# Patient Record
Sex: FEMALE | Race: WHITE | NOT HISPANIC OR LATINO | Employment: FULL TIME | URBAN - METROPOLITAN AREA
[De-identification: names, ages, dates, MRNs, and addresses within clinical notes are randomized per-mention and may not be internally consistent; named-entity substitution may affect disease eponyms.]

---

## 2017-11-15 ENCOUNTER — APPOINTMENT (OUTPATIENT)
Dept: RADIOLOGY | Facility: MEDICAL CENTER | Age: 22
End: 2017-11-15
Attending: EMERGENCY MEDICINE
Payer: MEDICAID

## 2017-11-15 ENCOUNTER — HOSPITAL ENCOUNTER (EMERGENCY)
Facility: MEDICAL CENTER | Age: 22
End: 2017-11-15
Attending: EMERGENCY MEDICINE
Payer: MEDICAID

## 2017-11-15 VITALS
WEIGHT: 178.79 LBS | BODY MASS INDEX: 26.48 KG/M2 | TEMPERATURE: 99.1 F | HEIGHT: 69 IN | RESPIRATION RATE: 16 BRPM | SYSTOLIC BLOOD PRESSURE: 134 MMHG | OXYGEN SATURATION: 100 % | DIASTOLIC BLOOD PRESSURE: 81 MMHG | HEART RATE: 109 BPM

## 2017-11-15 DIAGNOSIS — M94.0 COSTOCHONDRITIS: ICD-10-CM

## 2017-11-15 DIAGNOSIS — R07.89 CHEST WALL PAIN: ICD-10-CM

## 2017-11-15 LAB
EKG IMPRESSION: NORMAL
HCG UR QL: NEGATIVE

## 2017-11-15 PROCEDURE — 81025 URINE PREGNANCY TEST: CPT

## 2017-11-15 PROCEDURE — A9270 NON-COVERED ITEM OR SERVICE: HCPCS | Performed by: EMERGENCY MEDICINE

## 2017-11-15 PROCEDURE — 71010 DX-CHEST-LIMITED (1 VIEW): CPT

## 2017-11-15 PROCEDURE — 99283 EMERGENCY DEPT VISIT LOW MDM: CPT

## 2017-11-15 PROCEDURE — 93005 ELECTROCARDIOGRAM TRACING: CPT

## 2017-11-15 PROCEDURE — 93005 ELECTROCARDIOGRAM TRACING: CPT | Performed by: EMERGENCY MEDICINE

## 2017-11-15 PROCEDURE — 700102 HCHG RX REV CODE 250 W/ 637 OVERRIDE(OP): Performed by: EMERGENCY MEDICINE

## 2017-11-15 RX ORDER — IBUPROFEN 600 MG/1
600 TABLET ORAL ONCE
Status: COMPLETED | OUTPATIENT
Start: 2017-11-15 | End: 2017-11-15

## 2017-11-15 RX ADMIN — IBUPROFEN 600 MG: 600 TABLET, FILM COATED ORAL at 16:30

## 2017-11-15 NOTE — ED PROVIDER NOTES
"ED Provider Note    CHIEF COMPLAINT  Chief Complaint   Patient presents with   • Chest Wall Pain       HPI  Mary Casanova is a 22 y.o. female who presentsFor retrosternal chest pain onset at 11 AM today. Pain is not pleuritic. She has shortness of breath due to the pain. She is rhinorrhea and nasal congestion but no fever cough or shortness of breath. No trauma. No heartburn.    REVIEW OF SYSTEMS  Pertinent positives include: Chest pain. One week late on her menstrual period, nausea  Pertinent negatives include: Dyspnea, leg swelling, calf pain.    PAST MEDICAL HISTORY  Denies    SOCIAL HISTORY  No tobacco use.    CURRENT MEDICATIONS  None    ALLERGIES  Allergies   Allergen Reactions   • Coconut Oil        PHYSICAL EXAM  VITAL SIGNS: /81   Pulse (!) 109   Temp 37.3 °C (99.1 °F)   Resp 16   Ht 1.753 m (5' 9\")   Wt 81.1 kg (178 lb 12.7 oz)   SpO2 100%   BMI 26.40 kg/m²    Constitutional :  Well developed, Well nourished,Borderline blood pressure elevation and tachycardia cardiac. No hypoxia room air.   HNT: Oropharynx moist without erythema.   Eyes: pupils reactive without eye discharge nor conjunctival hyperemia.  Cardiovascular: Tachycardic Regular rhythm, No murmurs, No rubs, No gallops.  No cyanosis. No dependent edema or calf cords.  Respiratory: Marked sternal chest wall tenderness which reproduces symptoms. No rales, rhonchi, wheeze.  Abdomen:  Soft, nontender  Skin: Warm, dry, no erythema, no rash.   Musculoskeletal: no limb deformities.    RADIOLOGY:  DX-CHEST-LIMITED (1 VIEW)   Final Result      No acute cardiopulmonary disease.      EKG Interpretation    Interpreted by me    Rhythm: normal sinus   Rate: normal at 85  Axis: normal  Ectopy: none  Conduction: normal  ST Segments: no acute change  T Waves: no acute change  Q Waves: none    Clinical Impression: Normal sinus rhythm        COURSE & MEDICAL DECISION MAKING  Well appearing patient presents with chest wall pain onset today that is " likely costochondritis. There is no evidence of pneumonia or pneumothorax or myocardial ischemia. Pulmonary embolism is unlikely.    PLAN:  Ibuprofen and Tylenol  Costochondritis handout  Return for shortness of breath, fever and cough, leg swelling    CONDITION:  Good.    FINAL IMPRESSION:  1. Chest wall pain    2. Costochondritis          Electronically signed by: Angel Yoon, 11/15/2017

## 2017-11-15 NOTE — ED NOTES
Pt ambulatory to triage c/o mid sternal chest wall pain and swelling that began suddenly approx 2 hours ago. Pt states pain is worse with inspiration and palpation. Pt denies recent illness or trauma. nad

## 2017-11-16 NOTE — ED NOTES
Patient dc'd to home w/ family. Patient alert and oriented x 4. Patient ambulatory w/ steady gait. Patient verbalizes understanding of discharge instructions and follow up care. Patient denies questions upon discharge.

## 2017-11-16 NOTE — DISCHARGE INSTRUCTIONS
Costochondritis  Take ibuprofen 600mg four times a day for up to 5 days.  Add tylenol as needed for persistent pain.  Return for leg swelling, shortness of breath, fever and cough or ill appearance.    Costochondritis, sometimes called Tietze syndrome, is a swelling and irritation (inflammation) of the tissue (cartilage) that connects your ribs with your breastbone (sternum). It causes pain in the chest and rib area. Costochondritis usually goes away on its own over time. It can take up to 6 weeks or longer to get better, especially if you are unable to limit your activities.  CAUSES   Some cases of costochondritis have no known cause. Possible causes include:  · Injury (trauma).  · Exercise or activity such as lifting.  · Severe coughing.  SIGNS AND SYMPTOMS  · Pain and tenderness in the chest and rib area.  · Pain that gets worse when coughing or taking deep breaths.  · Pain that gets worse with specific movements.  DIAGNOSIS   Your health care provider will do a physical exam and ask about your symptoms. Chest X-rays or other tests may be done to rule out other problems.  TREATMENT   Costochondritis usually goes away on its own over time. Your health care provider may prescribe medicine to help relieve pain.  HOME CARE INSTRUCTIONS   · Avoid exhausting physical activity. Try not to strain your ribs during normal activity. This would include any activities using chest, abdominal, and side muscles, especially if heavy weights are used.  · Apply ice to the affected area for the first 2 days after the pain begins.  ¨ Put ice in a plastic bag.  ¨ Place a towel between your skin and the bag.  ¨ Leave the ice on for 20 minutes, 2-3 times a day.  · Only take over-the-counter or prescription medicines as directed by your health care provider.  SEEK MEDICAL CARE IF:  · You have redness or swelling at the rib joints. These are signs of infection.  · Your pain does not go away despite rest or medicine.  SEEK IMMEDIATE  MEDICAL CARE IF:   · Your pain increases or you are very uncomfortable.  · You have shortness of breath or difficulty breathing.  · You cough up blood.  · You have worse chest pains, sweating, or vomiting.  · You have a fever or persistent symptoms for more than 2-3 days.  · You have a fever and your symptoms suddenly get worse.  MAKE SURE YOU:   · Understand these instructions.  · Will watch your condition.  · Will get help right away if you are not doing well or get worse.     This information is not intended to replace advice given to you by your health care provider. Make sure you discuss any questions you have with your health care provider.     Document Released: 09/27/2006 Document Revised: 10/08/2014 Document Reviewed: 07/22/2014  [employer] Interactive Patient Education ©2016 [employer]

## 2017-11-22 ENCOUNTER — NON-PROVIDER VISIT (OUTPATIENT)
Dept: OBGYN | Facility: CLINIC | Age: 22
End: 2017-11-22

## 2017-11-22 DIAGNOSIS — Z32.01 POSITIVE PREGNANCY TEST: ICD-10-CM

## 2017-11-22 LAB
INT CON NEG: NEGATIVE
INT CON POS: POSITIVE
POC URINE PREGNANCY TEST: POSITIVE

## 2017-11-22 PROCEDURE — 81025 URINE PREGNANCY TEST: CPT | Performed by: PHYSICIAN ASSISTANT

## 2017-12-04 ENCOUNTER — HOSPITAL ENCOUNTER (EMERGENCY)
Facility: MEDICAL CENTER | Age: 22
End: 2017-12-04
Attending: EMERGENCY MEDICINE
Payer: MEDICAID

## 2017-12-04 ENCOUNTER — APPOINTMENT (OUTPATIENT)
Dept: RADIOLOGY | Facility: MEDICAL CENTER | Age: 22
End: 2017-12-04
Attending: EMERGENCY MEDICINE
Payer: MEDICAID

## 2017-12-04 VITALS
HEART RATE: 82 BPM | TEMPERATURE: 98.9 F | BODY MASS INDEX: 27.49 KG/M2 | DIASTOLIC BLOOD PRESSURE: 73 MMHG | WEIGHT: 185.63 LBS | OXYGEN SATURATION: 98 % | SYSTOLIC BLOOD PRESSURE: 120 MMHG | RESPIRATION RATE: 16 BRPM | HEIGHT: 69 IN

## 2017-12-04 DIAGNOSIS — O20.0 THREATENED MISCARRIAGE: ICD-10-CM

## 2017-12-04 PROCEDURE — 99284 EMERGENCY DEPT VISIT MOD MDM: CPT

## 2017-12-04 PROCEDURE — 76817 TRANSVAGINAL US OBSTETRIC: CPT

## 2017-12-04 ASSESSMENT — LIFESTYLE VARIABLES: DO YOU DRINK ALCOHOL: NO

## 2017-12-04 ASSESSMENT — PAIN SCALES - GENERAL: PAINLEVEL_OUTOF10: 6

## 2017-12-05 NOTE — ED NOTES
Ambulatory to room.  Pr reports cramping x3 weeks.  Attempted to schedule with pregnancy center, but no appointment available until January.  Denies discharge, foul odor, bleeding.  Chart placed for ERP eval.

## 2017-12-05 NOTE — DISCHARGE INSTRUCTIONS
Threatened Miscarriage  A threatened miscarriage occurs when you have vaginal bleeding during your first 20 weeks of pregnancy but the pregnancy has not ended. If you have vaginal bleeding during this time, your health care provider will do tests to make sure you are still pregnant. If the tests show you are still pregnant and the developing baby (fetus) inside your womb (uterus) is still growing, your condition is considered a threatened miscarriage.  A threatened miscarriage does not mean your pregnancy will end, but it does increase the risk of losing your pregnancy (complete miscarriage).  CAUSES   The cause of a threatened miscarriage is usually not known. If you go on to have a complete miscarriage, the most common cause is an abnormal number of chromosomes in the developing baby. Chromosomes are the structures inside cells that hold all your genetic material.  Some causes of vaginal bleeding that do not result in miscarriage include:  · Having sex.  · Having an infection.  · Normal hormone changes of pregnancy.  · Bleeding that occurs when an egg implants in your uterus.  RISK FACTORS  Risk factors for bleeding in early pregnancy include:  · Obesity.  · Smoking.  · Drinking excessive amounts of alcohol or caffeine.  · Recreational drug use.  SIGNS AND SYMPTOMS  · Light vaginal bleeding.  · Mild abdominal pain or cramps.  DIAGNOSIS   If you have bleeding with or without abdominal pain before 20 weeks of pregnancy, your health care provider will do tests to check whether you are still pregnant. One important test involves using sound waves and a computer (ultrasound) to create images of the inside of your uterus. Other tests include an internal exam of your vagina and uterus (pelvic exam) and measurement of your baby's heart rate.   You may be diagnosed with a threatened miscarriage if:  · Ultrasound testing shows you are still pregnant.  · Your baby's heart rate is strong.  · A pelvic exam shows that the  opening between your uterus and your vagina (cervix) is closed.  · Your heart rate and blood pressure are stable.  · Blood tests confirm you are still pregnant.  TREATMENT   No treatments have been shown to prevent a threatened miscarriage from going on to a complete miscarriage. However, the right home care is important.   HOME CARE INSTRUCTIONS   · Make sure you keep all your appointments for prenatal care. This is very important.  · Get plenty of rest.  · Do not have sex or use tampons if you have vaginal bleeding.  · Do not douche.  · Do not smoke or use recreational drugs.  · Do not drink alcohol.  · Avoid caffeine.  SEEK MEDICAL CARE IF:  · You have light vaginal bleeding or spotting while pregnant.  · You have abdominal pain or cramping.  · You have a fever.  SEEK IMMEDIATE MEDICAL CARE IF:  · You have heavy vaginal bleeding.  · You have blood clots coming from your vagina.  · You have severe low back pain or abdominal cramps.  · You have fever, chills, and severe abdominal pain.  MAKE SURE YOU:  · Understand these instructions.  · Will watch your condition.  · Will get help right away if you are not doing well or get worse.     This information is not intended to replace advice given to you by your health care provider. Make sure you discuss any questions you have with your health care provider.     Document Released: 12/18/2006 Document Revised: 12/23/2014 Document Reviewed: 10/14/2014  [employer] Interactive Patient Education ©2016 [employer]

## 2017-12-05 NOTE — ED PROVIDER NOTES
ED Provider Note    HPI:  Patient is a 22-year-old female who presented to the emergency department December 4, 2017at 5:27 PM with a chief complaint of crampy abdominal pain.    Patient is proximally weeks pregnant. She's had crampy abdominal pain over the last 3 weeks. She had no vaginal bleeding. She had no nausea vomiting. No fever no chills no cough. No dysuria. Pain is in the pelvic region both sides. It is not positional in nature. Nothing seems to make it better or worse.    Review of Systems: Positive for intermittent crampy abdominal pain negative for nausea vomiting fever chills cough dysuria. Review of systems reviewed with patient, all other systems negative    Past medical/surgical history: Patient is a weeks pregnant. Costochondritis tonsillectomy adenoidectomy      Medications: Prenatal vitamins    Allergies: Coconut oil    Social History: Positive for smoking positive for alcohol use      Physical exam: Constitutional: Pleasant female awake alert   Vital signs: Temperature 98.7 pulse 95 respirations 16 blood pressure 146/65 pulse oximetry 97%  EYES: PERRL, EOMI, Conjunctivae and sclera normal, eyelids normal bilaterally.  Neck: Trachea midline. No cervical masses seen or palpated. Normal range of motion, supple. No meningeal signs elicited.  Cardiac: Regular rate and rhythm. S1-S2 present. No S3 or S4 present. No murmurs, rubs, or gallops heard. No edema or varicosities were seen.   Lungs: Clear to auscultation with good aeration throughout. No wheezes, rales, or rhonchi heard. Patient's chest wall moved symmetrically with each respiratory effort. Patient was not making use of accessory muscles of respiration in breathing.  Abdomen: Soft nontender to palpation. Subjective crampiness in the pelvic region that is not increased with palpation. No rebound or guarding elicited. No organomegaly identified. No pulsatile abdominal masses identified.   Musculoskeletal:  no  pain with palpitation or movement  of muscle, bone or joint , no obvious musculoskeletal deformities identified.  Neurologic: alert and awake answers questions appropriately. Moves all four extremities independently, no gross focal abnormalities identified. Normal strength and motor.  Skin: no rash or lesion seen, no palpable dermatologic lesions identified.  Psychiatric: Patient appeared to be slightly anxious but not inappropriately so. She was not delusional or hallucinating    Medical decision making:  Given the presenting complaints about ruling out ectopic pregnancy was needed. Pelvic ultrasound obtained; 6 week IUP appears to be present. No other acute abnormalities were seen.    Patient be discharged with instructions for threatened miscarriage. She'll follow-up with the pregnancy Center. Patient's counselor return to ED for any vaginal bleeding increasing pain vomiting or other problems. As the patient has had no bleeding at this time rhogam would not be indicated.    Patient verbalized understanding of the above instructions and states she will comply    Impression threatened miscarriage

## 2017-12-05 NOTE — ED NOTES
Chief Complaint   Patient presents with   • Abdominal Cramping     x3wks, 8wks pregnant     Pt ambulated to triage, she said 8wks pregnant and would like to get checked due to abdominal cramping. Denies vaginal bleeding.

## 2018-01-04 ENCOUNTER — HOSPITAL ENCOUNTER (EMERGENCY)
Facility: MEDICAL CENTER | Age: 23
End: 2018-01-04
Attending: EMERGENCY MEDICINE
Payer: MEDICAID

## 2018-01-04 VITALS
BODY MASS INDEX: 26.35 KG/M2 | RESPIRATION RATE: 18 BRPM | SYSTOLIC BLOOD PRESSURE: 128 MMHG | DIASTOLIC BLOOD PRESSURE: 77 MMHG | HEART RATE: 96 BPM | TEMPERATURE: 97.9 F | OXYGEN SATURATION: 97 % | WEIGHT: 177.91 LBS | HEIGHT: 69 IN

## 2018-01-04 DIAGNOSIS — Z3A.10 10 WEEKS GESTATION OF PREGNANCY: ICD-10-CM

## 2018-01-04 DIAGNOSIS — R10.30 LOWER ABDOMINAL PAIN: ICD-10-CM

## 2018-01-04 LAB
ALBUMIN SERPL BCP-MCNC: 4.3 G/DL (ref 3.2–4.9)
ALBUMIN/GLOB SERPL: 1.2 G/DL
ALP SERPL-CCNC: 53 U/L (ref 30–99)
ALT SERPL-CCNC: 11 U/L (ref 2–50)
ANION GAP SERPL CALC-SCNC: 10 MMOL/L (ref 0–11.9)
APPEARANCE UR: ABNORMAL
AST SERPL-CCNC: 37 U/L (ref 12–45)
BACTERIA #/AREA URNS HPF: ABNORMAL /HPF
BACTERIA GENITAL QL WET PREP: NORMAL
BASOPHILS # BLD AUTO: 0 % (ref 0–1.8)
BASOPHILS # BLD: 0 K/UL (ref 0–0.12)
BILIRUB SERPL-MCNC: 0.4 MG/DL (ref 0.1–1.5)
BILIRUB UR QL STRIP.AUTO: NEGATIVE
BUN SERPL-MCNC: 4 MG/DL (ref 8–22)
CALCIUM SERPL-MCNC: 9.7 MG/DL (ref 8.5–10.5)
CHLORIDE SERPL-SCNC: 105 MMOL/L (ref 96–112)
CO2 SERPL-SCNC: 20 MMOL/L (ref 20–33)
COLOR UR: YELLOW
CREAT SERPL-MCNC: 0.53 MG/DL (ref 0.5–1.4)
EOSINOPHIL # BLD AUTO: 0.06 K/UL (ref 0–0.51)
EOSINOPHIL NFR BLD: 0.8 % (ref 0–6.9)
EPI CELLS #/AREA URNS HPF: ABNORMAL /HPF
ERYTHROCYTE [DISTWIDTH] IN BLOOD BY AUTOMATED COUNT: 36.3 FL (ref 35.9–50)
GFR SERPL CREATININE-BSD FRML MDRD: >60 ML/MIN/1.73 M 2
GLOBULIN SER CALC-MCNC: 3.6 G/DL (ref 1.9–3.5)
GLUCOSE SERPL-MCNC: 109 MG/DL (ref 65–99)
GLUCOSE UR STRIP.AUTO-MCNC: NEGATIVE MG/DL
HCT VFR BLD AUTO: 42.2 % (ref 37–47)
HGB BLD-MCNC: 14.8 G/DL (ref 12–16)
HYALINE CASTS #/AREA URNS LPF: ABNORMAL /LPF
KETONES UR STRIP.AUTO-MCNC: NEGATIVE MG/DL
LEUKOCYTE ESTERASE UR QL STRIP.AUTO: ABNORMAL
LIPASE SERPL-CCNC: 24 U/L (ref 11–82)
LYMPHOCYTES # BLD AUTO: 2.42 K/UL (ref 1–4.8)
LYMPHOCYTES NFR BLD: 30.2 % (ref 22–41)
MANUAL DIFF BLD: NORMAL
MCH RBC QN AUTO: 30.8 PG (ref 27–33)
MCHC RBC AUTO-ENTMCNC: 35.1 G/DL (ref 33.6–35)
MCV RBC AUTO: 87.9 FL (ref 81.4–97.8)
MICRO URNS: ABNORMAL
MONOCYTES # BLD AUTO: 0.21 K/UL (ref 0–0.85)
MONOCYTES NFR BLD AUTO: 2.6 % (ref 0–13.4)
MORPHOLOGY BLD-IMP: NORMAL
NEUTROPHILS # BLD AUTO: 5.31 K/UL (ref 2–7.15)
NEUTROPHILS NFR BLD: 66.4 % (ref 44–72)
NITRITE UR QL STRIP.AUTO: NEGATIVE
NRBC # BLD AUTO: 0 K/UL
NRBC BLD-RTO: 0 /100 WBC
PH UR STRIP.AUTO: 7.5 [PH]
PLATELET # BLD AUTO: 131 K/UL (ref 164–446)
PLATELET BLD QL SMEAR: NORMAL
PMV BLD AUTO: 11.1 FL (ref 9–12.9)
POTASSIUM SERPL-SCNC: 5.2 MMOL/L (ref 3.6–5.5)
PROT SERPL-MCNC: 7.9 G/DL (ref 6–8.2)
PROT UR QL STRIP: NEGATIVE MG/DL
RBC # BLD AUTO: 4.8 M/UL (ref 4.2–5.4)
RBC # URNS HPF: ABNORMAL /HPF
RBC BLD AUTO: NORMAL
RBC UR QL AUTO: NEGATIVE
SIGNIFICANT IND 70042: NORMAL
SITE SITE: NORMAL
SODIUM SERPL-SCNC: 135 MMOL/L (ref 135–145)
SOURCE SOURCE: NORMAL
SP GR UR STRIP.AUTO: 1.02
UROBILINOGEN UR STRIP.AUTO-MCNC: 1 MG/DL
WBC # BLD AUTO: 8 K/UL (ref 4.8–10.8)
WBC #/AREA URNS HPF: ABNORMAL /HPF

## 2018-01-04 PROCEDURE — 87591 N.GONORRHOEAE DNA AMP PROB: CPT | Mod: EDC

## 2018-01-04 PROCEDURE — 36415 COLL VENOUS BLD VENIPUNCTURE: CPT | Mod: EDC

## 2018-01-04 PROCEDURE — 99284 EMERGENCY DEPT VISIT MOD MDM: CPT | Mod: EDC

## 2018-01-04 PROCEDURE — 83690 ASSAY OF LIPASE: CPT | Mod: EDC

## 2018-01-04 PROCEDURE — 85007 BL SMEAR W/DIFF WBC COUNT: CPT | Mod: EDC

## 2018-01-04 PROCEDURE — 85027 COMPLETE CBC AUTOMATED: CPT | Mod: EDC

## 2018-01-04 PROCEDURE — 81001 URINALYSIS AUTO W/SCOPE: CPT | Mod: EDC

## 2018-01-04 PROCEDURE — 87491 CHLMYD TRACH DNA AMP PROBE: CPT | Mod: EDC

## 2018-01-04 PROCEDURE — 80053 COMPREHEN METABOLIC PANEL: CPT | Mod: EDC

## 2018-01-04 RX ORDER — NITROFURANTOIN 25; 75 MG/1; MG/1
100 CAPSULE ORAL 2 TIMES DAILY
Qty: 6 CAP | Refills: 0 | Status: SHIPPED | OUTPATIENT
Start: 2018-01-04 | End: 2018-01-07

## 2018-01-04 NOTE — ED NOTES
Ambulates to triage  Chief Complaint   Patient presents with   • Pregnancy     12 weeks 5 days   • Cramping     denies any vag bleeding     Was seen here about 1 month ago for same, and was told to come back if her cramping got worse.  Has an appt with her OB on 1/23 at the pregnancy center.

## 2018-01-05 RX ORDER — METRONIDAZOLE 500 MG/1
500 TABLET ORAL 2 TIMES DAILY
Qty: 14 TAB | Refills: 0 | Status: SHIPPED | OUTPATIENT
Start: 2018-01-05 | End: 2018-01-12

## 2018-01-05 NOTE — DISCHARGE INSTRUCTIONS
Your estimated date of delivery is 7/30/18 based on the ultrasound performed 12/4/17. At this time your lab work and exam are normal. Please keep your scheduled follow-up appointment at [facility] pregnancy center. Return to the emergency department if you develop worsening pain, vaginal bleeding, vomiting or inability to tolerate fluids, fevers, or any further concerns.    Abdominal Pain During Pregnancy  Abdominal pain is common in pregnancy. Most of the time, it does not cause harm. There are many causes of abdominal pain. Some causes are more serious than others. Some of the causes of abdominal pain in pregnancy are easily diagnosed. Occasionally, the diagnosis takes time to understand. Other times, the cause is not determined. Abdominal pain can be a sign that something is very wrong with the pregnancy, or the pain may have nothing to do with the pregnancy at all. For this reason, always tell your health care provider if you have any abdominal discomfort.  HOME CARE INSTRUCTIONS   Monitor your abdominal pain for any changes. The following actions may help to alleviate any discomfort you are experiencing:  · Do not have sexual intercourse or put anything in your vagina until your symptoms go away completely.  · Get plenty of rest until your pain improves.  · Drink clear fluids if you feel nauseous. Avoid solid food as long as you are uncomfortable or nauseous.  · Only take over-the-counter or prescription medicine as directed by your health care provider.  · Keep all follow-up appointments with your health care provider.  SEEK IMMEDIATE MEDICAL CARE IF:  · You are bleeding, leaking fluid, or passing tissue from the vagina.  · You have increasing pain or cramping.  · You have persistent vomiting.  · You have painful or bloody urination.  · You have a fever.  · You notice a decrease in your baby's movements.  · You have extreme weakness or feel faint.  · You have shortness of breath, with or without abdominal  pain.  · You develop a severe headache with abdominal pain.  · You have abnormal vaginal discharge with abdominal pain.  · You have persistent diarrhea.  · You have abdominal pain that continues even after rest, or gets worse.  MAKE SURE YOU:   · Understand these instructions.  · Will watch your condition.  · Will get help right away if you are not doing well or get worse.     This information is not intended to replace advice given to you by your health care provider. Make sure you discuss any questions you have with your health care provider.     Document Released: 12/18/2006 Document Revised: 10/08/2014 Document Reviewed: 07/17/2014  [email or web address] Interactive Patient Education ©2016 [employer]

## 2018-01-05 NOTE — ED PROVIDER NOTES
ED Provider Note     Scribed for Jeanette Campuzano M.D. by Beelm Gong. 2018, 4:28 PM.    Primary Care Provider: Pcp Pt States None  Means of arrival: walk in   History obtained from: patient  History limited by: none    CHIEF COMPLAINT  Chief Complaint   Patient presents with   • Pregnancy     12 weeks 5 days   • Cramping     denies any vag bleeding     HPI  Mary Casanova is a 22 y.o. Female  at 12 5/7 by last menstrual period start date who presents to the Emergency Department for episodic abdominal pain onset one month ago.  The patient describes the pain as a cramp and notes that it has progressively become more severe. She notes that the pain starts in her lower pelvic region and radiates up under her breast region. The patient notes that she is experiencing minor dysuria and minor vaginal bleeding.  She states that she is nauseated but denies any vomiting or diarrhea.  The patient denies any new chest pain, leg pain or swelling, abnormal bruising, or shortness of breath.  She was seen at the ED one month ago for abdominal cramping and had a normal transvaginal ultrasound.  The ED provider advised she return if her cramping worsens and she notes that the pain is the same quality but more severe.  The [facility] . The patient's last menstrual period started on 10/6/17.     REVIEW OF SYSTEMS  Pertinent positives include abdominal pain, dysuria, vaginal bleeding, nausea. Pertinent negatives include no vomiting, diarrhea, chest pain, leg pain, leg swelling, bruising, shortness of breath. See HPI for further details. As above, all other systems negative.  C    PAST MEDICAL HISTORY   has a past medical history of Costochondritis.    SOCIAL HISTORY  Social History   Substance Use Topics   • Smoking status: Current Some Day Smoker   • Smokeless tobacco: Never Used   • Alcohol use Yes      History   Drug Use No     SURGICAL HISTORY   has  "a past surgical history that includes tonsillectomy and adenoidectomy (12/30/2008).     CURRENT MEDICATIONS  No current facility-administered medications on file prior to encounter.      Current Outpatient Prescriptions on File Prior to Encounter   Medication Sig Dispense Refill   • Prenatal MV-Min-Fe Fum-FA-DHA (PRENATAL 1 PO) Take  by mouth.         ALLERGIES  Allergies   Allergen Reactions   • Coconut Oil      PHYSICAL EXAM  Vital Signs: /70   Pulse 98   Temp 37.2 °C (98.9 °F)   Resp 16   Ht 1.753 m (5' 9\")   Wt 80.7 kg (177 lb 14.6 oz)   SpO2 99%   BMI 26.27 kg/m²   Constitutional: Alert, no acute distress  HENT: Normocephalic, atraumatic, moist mucus membranes  Eyes: Pupils equal and reactive, normal conjunctiva, non-icteric  Neck: Supple, normal range of motion, no stridor  Cardiovascular: Regular rhythm, Normal peripheral perfusion, no cyanosis   Pulmonary: No respiratory distress, normal work of breathing, no accessory muscule usage  Abdomen: Soft, non tender, no peritoneal signs, bowel sounds are present. No right lower quadrant tenderness. On bedside transabdominal ultrasound,Intrauterine pregnancy is visualized with fetal heart rate 176  : Normal external genitalia, cervical os closed, no blood in vaginal vault, no CMT, no evidence of bleeding  Skin: Warm, dry, no rashes or lesions  Back: No pain with active range of motion  Musculoskeletal: Normal range of motion in all extremities, no swelling or deformity noted  Neurologic: Alert, oriented, normal motor function, no speech deficits  Psychiatric: Normal and appropriate mood and affect    LABS  Labs Reviewed   URINALYSIS - Abnormal; Notable for the following:        Result Value    Character Turbid (*)     Leukocyte Esterase Moderate (*)     All other components within normal limits   URINE MICROSCOPIC (W/UA) - Abnormal; Notable for the following:     WBC 20-50 (*)     Bacteria Few (*)     All other components within normal limits   CBC " "WITH DIFFERENTIAL - Abnormal; Notable for the following:     MCHC 35.1 (*)     Platelet Count 131 (*)     All other components within normal limits   COMP METABOLIC PANEL - Abnormal; Notable for the following:     Glucose 109 (*)     Bun 4 (*)     Globulin 3.6 (*)     All other components within normal limits   LIPASE   ESTIMATED GFR   DIFFERENTIAL MANUAL   PERIPHERAL SMEAR REVIEW   PLATELET ESTIMATE   MORPHOLOGY   WET PREP   CHLAMYDIA/GC PCR URINE OR SWAB   All labs reviewed by me.    COURSE & MEDICAL DECISION MAKING  Nursing notes, VS, PMSFHx reviewed in chart.     Medical records reviewed for continuity of care. Patient seen and evaluated 12/4/17 for abdominal pain and pelvic pain. Transvaginal ultrasound performed at that time demonstrates a 6 week 0 day intrauterine pregnancy with estimated date of delivery of 7/30/18. Fetal heart rate of 104 bpm.    4:28 PM - Patient seen and examined at bedside. Ordered urinalysis and urine microscopic, CBC with differential, CMP, lipase, chlamydia/GC PCR urine or swap, wet prep, differential manual, peripheral smear review, platelet estimate, morphology, estimated GFR to evaluate her symptoms. I performed a bedside ultrasound and discussed the fetal heart rate and plans to test her urine for possible infection.     6:54 PM  - Re-examined; The patient is resting in bed comfortably. I discussed her above findings and plans for discharge with a prescription for Macrobid 100mg capsule. She was given a referral to [facility] up with the [facility] and instructed to return to the ED if her symptoms worsen. Patient understands and agrees. Her vitals prior to discharge are: /77   Pulse 96   Temp 36.6 °C (97.9 °F)   Resp 18   Ht 1.753 m (5' 9\")   Wt 80.7 kg (177 lb 14.6 oz)   SpO2 97%   BMI 26.27 kg/m²     Differential diagnosis includes threatened miscarriage, normal pregnancy, cholecystitis, cholelithiasis, pancreatitis, appendicitis    Decision Making:  This is a 22 " y.o. year old who presents with 6 weeks of pelvic pain and abdominal cramping. She was previously seen in this emergency department, diagnosed with intrauterine pregnancy. Her primary concern is that she may be having a miscarriage. She is scheduled for follow-up [facility] pregnancy Center.    She is afebrile, her abdominal exam is benign with no right lower quadrant tenderness, no epigastric tenderness, no right upper quadrant tenderness. Physical exam is less concerning for appendicitis, cholecystitis, cholelithiasis nor pancreatitis. She has a normal vaginal exam, no evidence of vaginal bleeding, cervical os is closed. Intrauterine pregnancy is again visualized on my bedside ultrasound.     On laboratory evaluation wet prep demonstrates clue cells and white blood cells. Patient was discharged without a prescription for metronidazole. This prescription was left with charge nurse who will contact the patient with these results in the morning. Patient can  her prescription or we will call it into the pharmacy of her choice. White blood count is within normal limits, again less concerning for appendicitis or cholecystitis. CMP is within normal limits. Urinalysis significant for 20-50 white blood cells. Few bacteria. Culture sent. GC chlamydia pending at this time.    Due to her history of mild dysuria she was treated with Macrobid for urinary tract infection. She will be contacted to  a metronidazole prescription as described above. Return precautions were given including worsening pain, development of vaginal bleeding, development of vomiting, or any further concerns.    The patient will return for new or worsening symptoms and is stable at the time of discharge.    The patient is referred to a primary physician for blood pressure management, diabetic screening, and for all other preventative health concerns.    DISPOSITION:  Patient will be discharged home in stable condition.    FOLLOW UP:  [facility]  Pregnancy Center  [address] [place] [address]  [place] [address]  [phone]    Go in 2 weeks  Please keep your scheduled follow up appointment    [facility], Emergency Dept  [address]  [place] [address] [phone]  Go to  If symptoms worsen      OUTPATIENT MEDICATIONS:  Discharge Medication List as of 1/4/2018  7:05 PM      START taking these medications    Details   nitrofurantoin monohydr macro (MACROBID) 100 MG Cap Take 1 Cap by mouth 2 times a day for 3 days., Disp-6 Cap, R-0, Print Rx Paper           FINAL IMPRESSION  1. Lower abdominal pain    2. 10 weeks gestation of pregnancy          I, Belem Gong (Scribe), am scribing for, and in the presence of, Jeanette Campuzano M.D..    Electronically signed by: Belem Gong (Scribe), 1/4/2018    IJeanette M.D. personally performed the services described in this documentation, as scribed by Belem Gong in my presence, and it is both accurate and complete.    The note accurately reflects work and decisions made by me.  Jeanette Campuzano  1/5/2018  1:41 AM

## 2018-01-05 NOTE — ED NOTES
Pt provided discharge instructions.  In such instructions, the patient made aware of medical diagnosis, treatment team, follow up recommendations for continuity of care, how to access [facility] health information online, information on medical prescriptions (how to take, when to take/not to take, side effects and adverse effects), and other health information pertinent to patient's diagnosis.  Patient verbalized understanding of discharge information.

## 2018-01-05 NOTE — ED NOTES
Agree with triage RN.  Pt ambulated to room in NAD.  Pt states appx 12 weeks pregnant with lower bilateral abdominal cramping.  Pt denies n/v/d, pt denies vaginal bleeding

## 2018-01-05 NOTE — ED NOTES
Unsuccessful IV x 1.  Blood specimen obtained and sent to lab.  Pt refusing additional IV access/lab draw at this time

## 2018-01-06 LAB
C TRACH DNA SPEC QL NAA+PROBE: NEGATIVE
N GONORRHOEA DNA SPEC QL NAA+PROBE: NEGATIVE
SPECIMEN SOURCE: NORMAL

## 2018-01-06 NOTE — ED NOTES
Second call placed to notify pt that prescription was left in ED. No answer, second message left.

## 2018-01-23 ENCOUNTER — INITIAL PRENATAL (OUTPATIENT)
Dept: OBGYN | Facility: CLINIC | Age: 23
End: 2018-01-23
Payer: MEDICAID

## 2018-01-23 VITALS
HEIGHT: 69 IN | SYSTOLIC BLOOD PRESSURE: 106 MMHG | BODY MASS INDEX: 25.77 KG/M2 | DIASTOLIC BLOOD PRESSURE: 60 MMHG | WEIGHT: 174 LBS

## 2018-01-23 DIAGNOSIS — Z34.02 ENCOUNTER FOR SUPERVISION OF NORMAL FIRST PREGNANCY IN SECOND TRIMESTER: Primary | ICD-10-CM

## 2018-01-23 DIAGNOSIS — B37.9 YEAST INFECTION: ICD-10-CM

## 2018-01-23 LAB
APPEARANCE UR: NORMAL
BILIRUB UR STRIP-MCNC: NORMAL MG/DL
COLOR UR AUTO: NORMAL
GLUCOSE UR STRIP.AUTO-MCNC: NORMAL MG/DL
KETONES UR STRIP.AUTO-MCNC: NORMAL MG/DL
LEUKOCYTE ESTERASE UR QL STRIP.AUTO: NORMAL
NITRITE UR QL STRIP.AUTO: NORMAL
PH UR STRIP.AUTO: 6 [PH] (ref 5–8)
PROT UR QL STRIP: NORMAL MG/DL
RBC UR QL AUTO: NORMAL
SP GR UR STRIP.AUTO: 1.03
UROBILINOGEN UR STRIP-MCNC: NORMAL MG/DL

## 2018-01-23 PROCEDURE — 81002 URINALYSIS NONAUTO W/O SCOPE: CPT | Performed by: NURSE PRACTITIONER

## 2018-01-23 PROCEDURE — 59401 PR NEW OB VISIT: CPT | Performed by: NURSE PRACTITIONER

## 2018-01-23 RX ORDER — FLUCONAZOLE 150 MG/1
150 TABLET ORAL DAILY
Qty: 1 TAB | Refills: 0 | Status: SHIPPED | OUTPATIENT
Start: 2018-01-23

## 2018-01-23 ASSESSMENT — ENCOUNTER SYMPTOMS
EYES NEGATIVE: 1
CARDIOVASCULAR NEGATIVE: 1
NEUROLOGICAL NEGATIVE: 1
PSYCHIATRIC NEGATIVE: 1
MUSCULOSKELETAL NEGATIVE: 1
RESPIRATORY NEGATIVE: 1
GASTROINTESTINAL NEGATIVE: 1
CONSTITUTIONAL NEGATIVE: 1

## 2018-01-23 NOTE — LETTER
Cystic Fibrosis Carrier Testing  Mary Casanova    The following information is about a blood test that can be done to determine if you and/or your partner carry the gene for cystic fibrosis.    WHAT IS CYSTIC FIBROSIS?  · Cystic fibrosis (CF) is an inherited disease that affects more than 25,000 American children and young adults.  · Symptoms of CF vary but include lung congestion, pneumonia, diarrhea and poor growth.  Most people with CF have severe medical problems and some die at a young age.  Others have so few symptoms they are unaware they have CF.  · CF does not affect intelligence.  · Although there is no cure for CF at this time, scientists are making progress in improving treatment and in searching for a cure.  In the past many people with CF  at a very young age.  Today, many are living into their 20’s and 30’s.    IS THERE A CHANCE MY BABY COULD HAVE CYSTIC FIBROSIS?  · You can have a child with CF even if there is no history in your family (see chart below).  · CF testing can help determine if you are a carrier and at risk to have a child with CF.  Note: if both parents are carriers, there is a 1 in 4 (25%) chance with each pregnancy that they will have a child with CF.  · Carriers have one normal CF gene and one altered CF gene.  · People with CF have two altered CF genes.  · Most people have two normal copies of the CF gene.    Approximate risk that a couple with no family history of cystic fibrosis will have a child with cystic fibrosis:    Ethnic background / Risk     couple:  1 in 2,500   couple:  1 in 15,000            couple:  1 in 8,000     American couple:  1 in 32,000     WHAT TESTING IS AVAILABLE?  · There is a blood test that can be done to find out if you or your partner is a carrier.  · It is important to understand that CF carrier testing does not detect all CF carriers.  · If the test shows that you are both CF carriers, you unborn baby can  be tested to find out if the baby has CF.    HOW MUCH DOES IT COST TO HAVE CYSTIC FIBROSIS CARRIER TESTING?  · Cost and insurance coverage for CF carrier testing vary depending upon the laboratory used and your insurance policy.  · The average cost for CF carrier testing is $300 per person.  · Your genetic counselor can provide you with more information about cystic fibrosis carrier testing.    _____  Yes, I am interested in discussing carrier testing with a genetic counselor.    _____  No, I am not interested in CF carrier testing or in receiving more information about CF carrier testing.      Client signature: ________________________________________  1/23/2018

## 2018-01-23 NOTE — PROGRESS NOTES
Pt. Here for NOB visit today.  # [phone] sister's number, use temporarily due to her phone disconnected   Pt states went to [facility] ER on 12/4/17 for cramping and bleeding had U/S done   Pt. States having vaginal itching and burning due to medication she took for UTI  Pharmacy verified.   Flu vaccine declined.

## 2018-01-23 NOTE — PROGRESS NOTES
"Subjective:      S:  Mary Casanova is a 22 y.o.  female  @ EGA: 13w1d AKIRA: Estimated Date of Delivery: 18  per US who presents for her new OB exam.  She reports developing yeast infection from abx use, also cramping.  Desires AFP.  Declines CF.  Reports no FM, VB, LOF.  Denies dysuria, vaginal DC.  Pt is single and lives alone, FOB not involved. Has no support in area. Works at [employer].  Pregnancy is unplanned but desired.  Desires Centering Pregnancy.    O:    Vitals:    18 1526   BP: 106/60   Weight: 78.9 kg (174 lb)   Height: 1.753 m (5' 9\")    See H&P Prenatal Physical.  Wet mount: no yeast        FHTs: 155        Fundal ht: 13 cm     A:   1.  IUP @ 13w1d AKIRA: Estimated Date of Delivery: 18 per US         2.  S=D        3.    Patient Active Problem List    Diagnosis Date Noted   • Encounter for supervision of normal first pregnancy in second trimester 2018   • Yeast infection 2018         P:  1.  GC/CT negative on . Pap deferred due to age.         2.  Prenatal labs ordered - lab slip given        3.  Discussed PNV, diet, and adequate water intake        4.  NOB packet given        5.  Return to office in 4 wks        6.  Complete OB US 6-7 wks        7.   Centering Pregnancy        8.   Rx for diflucan, information on Monistat          HPI    Review of Systems   Constitutional: Negative.    HENT: Negative.    Eyes: Negative.    Respiratory: Negative.    Cardiovascular: Negative.    Gastrointestinal: Negative.    Genitourinary: Negative.         Uterus enlarged   Musculoskeletal: Negative.    Skin: Negative.    Neurological: Negative.    Endo/Heme/Allergies: Negative.    Psychiatric/Behavioral: Negative.           Objective:     /60   Ht 1.753 m (5' 9\")   Wt 78.9 kg (174 lb)   BMI 25.70 kg/m²      Physical Exam   Constitutional: She is oriented to person, place, and time. She appears well-developed and well-nourished.   HENT:   Head: Normocephalic and " atraumatic.   Eyes: Pupils are equal, round, and reactive to light.   Neck: Normal range of motion. Neck supple.   Cardiovascular: Normal rate, regular rhythm and normal heart sounds.    Pulmonary/Chest: Effort normal and breath sounds normal.   Abdominal: Soft.   Genitourinary: Vagina normal and uterus normal.   Musculoskeletal: Normal range of motion.   Neurological: She is alert and oriented to person, place, and time. She has normal reflexes.   Skin: Skin is warm and dry.   Psychiatric: She has a normal mood and affect. Her behavior is normal. Judgment and thought content normal.   Nursing note and vitals reviewed.              Assessment/Plan:     1. Encounter for supervision of normal first pregnancy in second trimester    - PREG CNTR PRENATAL PN; Future  - US-OB 2ND 3RD TRI COMPLETE; Future  - POCT Urinalysis    2. Yeast infection

## 2018-01-23 NOTE — LETTER
January 23, 2018         Patient: Mary Casanova   YOB: 1995   Date of Visit: 1/23/2018           To Whom it May Concern:    Mary Casanova was seen in my clinic on 1/23/2018. She may return to work 1-23-18    If you have any questions or concerns, please don't hesitate to call.        Sincerely,           BRIAN Costello.

## 2018-03-06 ENCOUNTER — HOSPITAL ENCOUNTER (EMERGENCY)
Facility: MEDICAL CENTER | Age: 23
End: 2018-03-06
Attending: EMERGENCY MEDICINE
Payer: MEDICAID

## 2018-03-06 VITALS
WEIGHT: 177.03 LBS | RESPIRATION RATE: 16 BRPM | HEART RATE: 94 BPM | SYSTOLIC BLOOD PRESSURE: 140 MMHG | BODY MASS INDEX: 26.22 KG/M2 | TEMPERATURE: 98.4 F | HEIGHT: 69 IN | OXYGEN SATURATION: 100 % | DIASTOLIC BLOOD PRESSURE: 78 MMHG

## 2018-03-06 DIAGNOSIS — Z3A.19 19 WEEKS GESTATION OF PREGNANCY: ICD-10-CM

## 2018-03-06 LAB
APPEARANCE UR: CLEAR
COLOR UR AUTO: YELLOW
GLUCOSE UR QL STRIP.AUTO: NEGATIVE MG/DL
HCG UR QL: POSITIVE
KETONES UR QL STRIP.AUTO: NEGATIVE MG/DL
LEUKOCYTE ESTERASE UR QL STRIP.AUTO: ABNORMAL
NITRITE UR QL STRIP.AUTO: NEGATIVE
PH UR STRIP.AUTO: 5.5 [PH]
PROT UR QL STRIP: NEGATIVE MG/DL
RBC UR QL AUTO: NEGATIVE
SP GR UR: 1.01

## 2018-03-06 PROCEDURE — 81002 URINALYSIS NONAUTO W/O SCOPE: CPT

## 2018-03-06 PROCEDURE — 99284 EMERGENCY DEPT VISIT MOD MDM: CPT

## 2018-03-06 PROCEDURE — 81025 URINE PREGNANCY TEST: CPT

## 2018-03-06 ASSESSMENT — PAIN SCALES - GENERAL: PAINLEVEL_OUTOF10: 0

## 2018-03-06 NOTE — DISCHARGE INSTRUCTIONS
First Trimester of Pregnancy  The first trimester of pregnancy is from week 1 until the end of week 12 (months 1 through 3). A week after a sperm fertilizes an egg, the egg will implant on the wall of the uterus. This embryo will begin to develop into a baby. Genes from you and your partner are forming the baby. The male genes determine whether the baby is a boy or a girl. At 6-8 weeks, the eyes and face are formed, and the heartbeat can be seen on ultrasound. At the end of 12 weeks, all the baby's organs are formed.   Now that you are pregnant, you will want to do everything you can to have a healthy baby. Two of the most important things are to get good prenatal care and to follow your health care provider's instructions. Prenatal care is all the medical care you receive before the baby's birth. This care will help prevent, find, and treat any problems during the pregnancy and childbirth.  BODY CHANGES  Your body goes through many changes during pregnancy. The changes vary from woman to woman.   · You may gain or lose a couple of pounds at first.  · You may feel sick to your stomach (nauseous) and throw up (vomit). If the vomiting is uncontrollable, call your health care provider.  · You may tire easily.  · You may develop headaches that can be relieved by medicines approved by your health care provider.  · You may urinate more often. Painful urination may mean you have a bladder infection.  · You may develop heartburn as a result of your pregnancy.  · You may develop constipation because certain hormones are causing the muscles that push waste through your intestines to slow down.  · You may develop hemorrhoids or swollen, bulging veins (varicose veins).  · Your breasts may begin to grow larger and become tender. Your nipples may stick out more, and the tissue that surrounds them (areola) may become darker.  · Your gums may bleed and may be sensitive to brushing and flossing.  · Dark spots or blotches (chloasma,  mask of pregnancy) may develop on your face. This will likely fade after the baby is born.  · Your menstrual periods will stop.  · You may have a loss of appetite.  · You may develop cravings for certain kinds of food.  · You may have changes in your emotions from day to day, such as being excited to be pregnant or being concerned that something may go wrong with the pregnancy and baby.  · You may have more vivid and strange dreams.  · You may have changes in your hair. These can include thickening of your hair, rapid growth, and changes in texture. Some women also have hair loss during or after pregnancy, or hair that feels dry or thin. Your hair will most likely return to normal after your baby is born.  WHAT TO EXPECT AT YOUR PRENATAL VISITS  During a routine prenatal visit:  · You will be weighed to make sure you and the baby are growing normally.  · Your blood pressure will be taken.  · Your abdomen will be measured to track your baby's growth.  · The fetal heartbeat will be listened to starting around week 10 or 12 of your pregnancy.  · Test results from any previous visits will be discussed.  Your health care provider may ask you:  · How you are feeling.  · If you are feeling the baby move.  · If you have had any abnormal symptoms, such as leaking fluid, bleeding, severe headaches, or abdominal cramping.  · If you are using any tobacco products, including cigarettes, chewing tobacco, and electronic cigarettes.  · If you have any questions.  Other tests that may be performed during your first trimester include:  · Blood tests to find your blood type and to check for the presence of any previous infections. They will also be used to check for low iron levels (anemia) and Rh antibodies. Later in the pregnancy, blood tests for diabetes will be done along with other tests if problems develop.  · Urine tests to check for infections, diabetes, or protein in the urine.  · An ultrasound to confirm the proper growth  and development of the baby.  · An amniocentesis to check for possible genetic problems.  · Fetal screens for spina bifida and Down syndrome.  · You may need other tests to make sure you and the baby are doing well.  · HIV (human immunodeficiency virus) testing. Routine prenatal testing includes screening for HIV, unless you choose not to have this test.  HOME CARE INSTRUCTIONS   Medicines   · Follow your health care provider's instructions regarding medicine use. Specific medicines may be either safe or unsafe to take during pregnancy.  · Take your prenatal vitamins as directed.  · If you develop constipation, try taking a stool softener if your health care provider approves.  Diet   · Eat regular, well-balanced meals. Choose a variety of foods, such as meat or vegetable-based protein, fish, milk and low-fat dairy products, vegetables, fruits, and whole grain breads and cereals. Your health care provider will help you determine the amount of weight gain that is right for you.  · Avoid raw meat and uncooked cheese. These carry germs that can cause birth defects in the baby.  · Eating four or five small meals rather than three large meals a day may help relieve nausea and vomiting. If you start to feel nauseous, eating a few soda crackers can be helpful. Drinking liquids between meals instead of during meals also seems to help nausea and vomiting.  · If you develop constipation, eat more high-fiber foods, such as fresh vegetables or fruit and whole grains. Drink enough fluids to keep your urine clear or pale yellow.  Activity and Exercise   · Exercise only as directed by your health care provider. Exercising will help you:  ¨ Control your weight.  ¨ Stay in shape.  ¨ Be prepared for labor and delivery.  · Experiencing pain or cramping in the lower abdomen or low back is a good sign that you should stop exercising. Check with your health care provider before continuing normal exercises.  · Try to avoid standing for  long periods of time. Move your legs often if you must  one place for a long time.  · Avoid heavy lifting.  · Wear low-heeled shoes, and practice good posture.  · You may continue to have sex unless your health care provider directs you otherwise.  Relief of Pain or Discomfort   · Wear a good support bra for breast tenderness.    · Take warm sitz baths to soothe any pain or discomfort caused by hemorrhoids. Use hemorrhoid cream if your health care provider approves.    · Rest with your legs elevated if you have leg cramps or low back pain.  · If you develop varicose veins in your legs, wear support hose. Elevate your feet for 15 minutes, 3-4 times a day. Limit salt in your diet.  Prenatal Care   · Schedule your prenatal visits by the twelfth week of pregnancy. They are usually scheduled monthly at first, then more often in the last 2 months before delivery.  · Write down your questions. Take them to your prenatal visits.  · Keep all your prenatal visits as directed by your health care provider.  Safety   · Wear your seat belt at all times when driving.  · Make a list of emergency phone numbers, including numbers for family, friends, the hospital, and police and fire departments.  General Tips   · Ask your health care provider for a referral to a local prenatal education class. Begin classes no later than at the beginning of month 6 of your pregnancy.  · Ask for help if you have counseling or nutritional needs during pregnancy. Your health care provider can offer advice or refer you to specialists for help with various needs.  · Do not use hot tubs, steam rooms, or saunas.  · Do not douche or use tampons or scented sanitary pads.  · Do not cross your legs for long periods of time.  · Avoid cat litter boxes and soil used by cats. These carry germs that can cause birth defects in the baby and possibly loss of the fetus by miscarriage or stillbirth.  · Avoid all smoking, herbs, alcohol, and medicines not  prescribed by your health care provider. Chemicals in these affect the formation and growth of the baby.  · Do not use any tobacco products, including cigarettes, chewing tobacco, and electronic cigarettes. If you need help quitting, ask your health care provider. You may receive counseling support and other resources to help you quit.  · Schedule a dentist appointment. At home, brush your teeth with a soft toothbrush and be gentle when you floss.  SEEK MEDICAL CARE IF:   · You have dizziness.  · You have mild pelvic cramps, pelvic pressure, or nagging pain in the abdominal area.  · You have persistent nausea, vomiting, or diarrhea.  · You have a bad smelling vaginal discharge.  · You have pain with urination.  · You notice increased swelling in your face, hands, legs, or ankles.  SEEK IMMEDIATE MEDICAL CARE IF:   · You have a fever.  · You are leaking fluid from your vagina.  · You have spotting or bleeding from your vagina.  · You have severe abdominal cramping or pain.  · You have rapid weight gain or loss.  · You vomit blood or material that looks like coffee grounds.  · You are exposed to [place] measles and have never had them.  · You are exposed to fifth disease or chickenpox.  · You develop a severe headache.  · You have shortness of breath.  · You have any kind of trauma, such as from a fall or a car accident.  This information is not intended to replace advice given to you by your health care provider. Make sure you discuss any questions you have with your health care provider.  Document Released: 12/12/2002 Document Revised: 01/08/2016 Document Reviewed: 10/28/2014  [employer] Interactive Patient Education © 2017 [employer]

## 2018-03-06 NOTE — ED TRIAGE NOTES
"Chief Complaint   Patient presents with   • Pregnancy     G1P). Pt tearful in triage and requesting termination of pregnancy of 19 weeks. Pt provided urine sample. Pt informed that ER will not terminate pregnancy and to f[facility]u with [facility]. Pt reports wanting to speak with MD. Denies any med hx or c/o. Blood pressure 140/78, pulse 94, temperature 36.9 °C (98.4 °F), resp. rate 16, height 1.753 m (5' 9\"), weight 80.3 kg (177 lb 0.5 oz), SpO2 100 %.      "

## 2018-03-06 NOTE — ED PROVIDER NOTES
"ED Provider Note    CHIEF COMPLAINT  Chief Complaint   Patient presents with   • Pregnancy       HPI  Mary Casanova is a 23 y.o. female who presents to the emergency department requesting termination of pregnancy. She states she is 19 weeks pregnant. For undisclosed reasons she has decided that she does not want to keep this pregnancy. She denies any medical complaints with regards to this pregnancy.    Chart was reviewed noting six-week IUP on ultrasound obtained 12/4    REVIEW OF SYSTEMS  Pertinent negative: Denies fever abdominal pain dysuria    PAST MEDICAL HISTORY  Past Medical History:   Diagnosis Date   • Allergy    • Anemia    • Costochondritis    • Head ache    • Migraine    • Urinary tract infection    • Yeast infection 1/23/2018       SOCIAL HISTORY  Social History   Substance Use Topics   • Smoking status: Current Some Day Smoker     Types: Cigarettes   • Smokeless tobacco: Never Used      Comment: Pt states smoking 1/2 a day.    • Alcohol use Yes       SURGICAL HISTORY  Past Surgical History:   Procedure Laterality Date   • TONSILLECTOMY AND ADENOIDECTOMY  12/30/2008    Performed by RADHA ARANA at SURGERY SAME DAY [facility] ORS       ALLERGIES  Allergies   Allergen Reactions   • Coconut Oil        PHYSICAL EXAM  VITAL SIGNS: /78   Pulse 94   Temp 36.9 °C (98.4 °F)   Resp 16   Ht 1.753 m (5' 9\")   Wt 80.3 kg (177 lb 0.5 oz)   SpO2 100%   BMI 26.14 kg/m²    Constitutional: Awake and alert.  HENT:  Grossly normal  Eyes: Grossly normal  Cardiovascular: Normal heart rate   Thorax & Lungs: No respiratory distress  Psychiatric: Affect normal    COURSE & MEDICAL DECISION MAKING  Patient presents requesting termination of pregnancy. She was told in triage that we do not perform this out of the emergency department. She wanted to speak to a physician. She does not have any acute medical complaints or indication for emergency workup. I've given her the name of the only for undesired pregnancy[facility]. She was advised that I am not familiar with any of their policies or procedures. She was advised to return to the ER for any emergency complaints or concerns.    Patient referred to primary provider for blood pressure management    FINAL IMPRESSION  1. Undesired pregnancy      Disposition: home in good condition      This dictation was created using voice recognition software. The accuracy of the dictation is limited to the abilities of the software.  The nursing notes were reviewed and certain aspects of this information were incorporated into this note.      Electronically signed by: Antoni Castro, 3/6/2018 3:57 PM

## 2018-04-03 ENCOUNTER — HOSPITAL ENCOUNTER (EMERGENCY)
Facility: MEDICAL CENTER | Age: 23
End: 2018-04-03
Payer: MEDICAID

## 2018-04-03 ENCOUNTER — HOSPITAL ENCOUNTER (OUTPATIENT)
Facility: MEDICAL CENTER | Age: 23
End: 2018-04-03
Attending: OBSTETRICS & GYNECOLOGY | Admitting: OBSTETRICS & GYNECOLOGY
Payer: MEDICAID

## 2018-04-03 ENCOUNTER — APPOINTMENT (OUTPATIENT)
Dept: RADIOLOGY | Facility: MEDICAL CENTER | Age: 23
End: 2018-04-03
Attending: STUDENT IN AN ORGANIZED HEALTH CARE EDUCATION/TRAINING PROGRAM
Payer: MEDICAID

## 2018-04-03 VITALS — DIASTOLIC BLOOD PRESSURE: 76 MMHG | TEMPERATURE: 97.5 F | SYSTOLIC BLOOD PRESSURE: 114 MMHG | HEART RATE: 96 BPM

## 2018-04-03 LAB
ABO GROUP BLD: NORMAL
APPEARANCE UR: CLEAR
BASOPHILS # BLD AUTO: 0.2 % (ref 0–1.8)
BASOPHILS # BLD: 0.02 K/UL (ref 0–0.12)
BLD GP AB SCN SERPL QL: NORMAL
COLOR UR AUTO: YELLOW
EOSINOPHIL # BLD AUTO: 0.11 K/UL (ref 0–0.51)
EOSINOPHIL NFR BLD: 1.2 % (ref 0–6.9)
ERYTHROCYTE [DISTWIDTH] IN BLOOD BY AUTOMATED COUNT: 42.6 FL (ref 35.9–50)
GLUCOSE UR QL STRIP.AUTO: NEGATIVE MG/DL
HBV SURFACE AG SER QL: NEGATIVE
HCT VFR BLD AUTO: 36.1 % (ref 37–47)
HCV AB SER QL: NEGATIVE
HGB BLD-MCNC: 12.5 G/DL (ref 12–16)
HIV 1+2 AB+HIV1 P24 AG SERPL QL IA: NON REACTIVE
IMM GRANULOCYTES # BLD AUTO: 0.05 K/UL (ref 0–0.11)
IMM GRANULOCYTES NFR BLD AUTO: 0.5 % (ref 0–0.9)
KETONES UR QL STRIP.AUTO: NEGATIVE MG/DL
LEUKOCYTE ESTERASE UR QL STRIP.AUTO: ABNORMAL
LYMPHOCYTES # BLD AUTO: 3.05 K/UL (ref 1–4.8)
LYMPHOCYTES NFR BLD: 32.6 % (ref 22–41)
MCH RBC QN AUTO: 32.4 PG (ref 27–33)
MCHC RBC AUTO-ENTMCNC: 34.6 G/DL (ref 33.6–35)
MCV RBC AUTO: 93.5 FL (ref 81.4–97.8)
MONOCYTES # BLD AUTO: 0.76 K/UL (ref 0–0.85)
MONOCYTES NFR BLD AUTO: 8.1 % (ref 0–13.4)
NEUTROPHILS # BLD AUTO: 5.36 K/UL (ref 2–7.15)
NEUTROPHILS NFR BLD: 57.4 % (ref 44–72)
NITRITE UR QL STRIP.AUTO: NEGATIVE
NRBC # BLD AUTO: 0 K/UL
NRBC BLD-RTO: 0 /100 WBC
PH UR STRIP.AUTO: 7 [PH]
PLATELET # BLD AUTO: 207 K/UL (ref 164–446)
PMV BLD AUTO: 10.4 FL (ref 9–12.9)
PROT UR QL STRIP: NEGATIVE MG/DL
RBC # BLD AUTO: 3.86 M/UL (ref 4.2–5.4)
RBC UR QL AUTO: NEGATIVE
RH BLD: NORMAL
RUBV AB SER QL: 6.5 IU/ML
SP GR UR: 1.01
TREPONEMA PALLIDUM IGG+IGM AB [PRESENCE] IN SERUM OR PLASMA BY IMMUNOASSAY: NON REACTIVE
WBC # BLD AUTO: 9.4 K/UL (ref 4.8–10.8)

## 2018-04-03 PROCEDURE — 87340 HEPATITIS B SURFACE AG IA: CPT

## 2018-04-03 PROCEDURE — 36415 COLL VENOUS BLD VENIPUNCTURE: CPT

## 2018-04-03 PROCEDURE — 85025 COMPLETE CBC W/AUTO DIFF WBC: CPT

## 2018-04-03 PROCEDURE — 86850 RBC ANTIBODY SCREEN: CPT

## 2018-04-03 PROCEDURE — 87389 HIV-1 AG W/HIV-1&-2 AB AG IA: CPT

## 2018-04-03 PROCEDURE — 86900 BLOOD TYPING SEROLOGIC ABO: CPT

## 2018-04-03 PROCEDURE — 86803 HEPATITIS C AB TEST: CPT

## 2018-04-03 PROCEDURE — 86901 BLOOD TYPING SEROLOGIC RH(D): CPT

## 2018-04-03 PROCEDURE — 81002 URINALYSIS NONAUTO W/O SCOPE: CPT

## 2018-04-03 PROCEDURE — 86780 TREPONEMA PALLIDUM: CPT

## 2018-04-03 PROCEDURE — 86762 RUBELLA ANTIBODY: CPT

## 2018-04-03 PROCEDURE — 76805 OB US >/= 14 WKS SNGL FETUS: CPT

## 2018-04-03 NOTE — PROGRESS NOTES
"[facility] TRIAGE PROGRESS NOTE    PATIENT ID:  NAME:  Mary Casanova  MRN:               [id number]  YOB: 1995     23 y.o. female  at 23w1d coming in for cramping and vaginal bleeding for the past 3d.     Subjective: Pt complains of vaginal bleeding for 3days. The amount of blood is less than during menstrual bleeding. The patient states she has no bleeding when she is at home, just spotting, but her bleeding worsens when she is at work and has to stand. She is also having uterine cramping since she found out she is pregnant at 6w. The cramping is about the same, not better or worse than usually, but it's in a different place. Usually her cramping is in the lower abdomen, but today she feels cramps in the epigastrium. No vomiting or diarrhea, no fever, no sexual intercourse lately. She is smoking about 3-4 cigarets a day. The patient had one appointment with [facility] and had to cancel her second appointment and was unable to get hold of [facility] to make another appointment. Last US done at 6w. No PNL done yet.     The patient states \"she was diagnosed with ovarian cancer at [facility] when she was 20, but no therapy was done at that time. She was just told she has 97% chance of dieing and that she can have surgery she she is 27yo and that she will never get pregnant. \" The patient doesn't remember the name of the physician who she has seen at that time.   Patient informed that there are no ovarian masses seen on US performed in the ED when she was 6w pregnant.      Pregnancy complicated by smoking.     negative  For CTXS.   negative Feels pain, feels cramps in her epigastrium   negative for LOF  positive for vaginal bleeding, similar to menstrual bleeding, but smaller amount of blood   positive for fetal movement    ROS: Patient denies any fever chills, nausea, vomiting, headache, chest pain, shortness of breath, or dysuria or unusual swelling of hands or feet.     Objective:    Vitals: "    18 1122   BP: 114/76   Pulse: 96   Temp: 36.4 °C (97.5 °F)     Temp (24hrs), Av.4 °C (97.5 °F), Min:36.4 °C (97.5 °F), Max:36.4 °C (97.5 °F)    General: No acute distress, resting comfortably in bed.  HEENT: normocephalic, nontraumatic,   Cardiovascular: Heart RRR with no murmurs, rubs or gallops. Distal Pulses 2+  Respiratory: symmetric chest expansion, lungs CTAB, with no wheezes, rales, rhonci  Abdomen: gravid, some tenderness in the epigastrium, no rebound or guarding, BS present   Musculoskeletal: strength 5/5 in four extremities  Neuro: non focal with no numbness, tingling or changes in sensation    Cervix: Closed/thick and high, no blood seen  Brenas: No Uterine Contractions, some irritability seen  FHRM: present     Assessment: 23 y.o. female  at 23w1d.  Recent Labs      18   1234   WBC  9.4   RBC  3.86*   HEMOGLOBIN  12.5   HEMATOCRIT  36.1*   MCV  93.5   MCH  32.4   RDW  42.6   PLATELETCT  207   MPV  10.4   NEUTSPOLYS  57.40   LYMPHOCYTES  32.60   MONOCYTES  8.10   EOSINOPHILS  1.20   BASOPHILS  0.20     Recent Labs      18   1226  18   1234   ABOGROUP  O   --    RUBELLAIGG   --   6.50   HEPBSAG   --   Negative   HEPCAB   --   Negative     HIV and Syphilis non reactive     PNL wnl  UA: trace leukocyte esterase, otherwise wnl  US: Single intrauterine pregnancy of an estimated gestational age of 23 weeks and 3 days with an estimated date of delivery of 2018.         Limited evaluation of the cardiac outflow tracts and placental cord insertion. Follow-up is recommended.         Fetal survey otherwise within normal limits.         No maternal adnexal mass is identified.  Physical and cervical exam reassuring,  labor or miscarriage ruled out.    Plan:   1. Discharge home with return precautions and instructions to return to the hospital if you develop fever, increased pain, foul smelling discharge, vaginal bleeding, decreased fetal movement, uterine contractions.     2. Follow up with [facility].     Discussed case with EUSEBIA Dickson Attending. Case was discussed and attending agreed with plan prior to discharge of patient.

## 2018-05-07 ENCOUNTER — TELEPHONE (OUTPATIENT)
Dept: OBGYN | Facility: CLINIC | Age: 23
End: 2018-05-07

## 2018-05-07 ENCOUNTER — ROUTINE PRENATAL (OUTPATIENT)
Dept: OBGYN | Facility: CLINIC | Age: 23
End: 2018-05-07
Payer: MEDICAID

## 2018-05-07 VITALS — DIASTOLIC BLOOD PRESSURE: 68 MMHG | BODY MASS INDEX: 27.47 KG/M2 | WEIGHT: 186 LBS | SYSTOLIC BLOOD PRESSURE: 118 MMHG

## 2018-05-07 DIAGNOSIS — B37.9 YEAST INFECTION: ICD-10-CM

## 2018-05-07 DIAGNOSIS — Z34.02 ENCOUNTER FOR SUPERVISION OF NORMAL FIRST PREGNANCY IN SECOND TRIMESTER: Primary | ICD-10-CM

## 2018-05-07 PROCEDURE — 90040 PR PRENATAL FOLLOW UP: CPT | Performed by: NURSE PRACTITIONER
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
## 2018-05-07 NOTE — LETTER
"Count Your Baby's Movements  Another step to a healthy delivery    Mary Hanson             Dept: [phone]    How Many Weeks Pregnant? 28w0d    Date to Begin Countin18              How to use this chart    One way for your physician to keep track of your baby's health is by knowing how often the baby moves (or \"kicks\") in your womb.  You can help your physician to do this by using this chart every day.    Every day, you should see how many hours it takes for your baby to move 10 times.  Start in the morning, as soon as you get up.    · First, write down the time your baby moves until you get to 10.  · Check off one box every time your baby moves until you get to 10.  · Write down the time you finished counting in the last column.  · Total how long it took to count up all 10 movements.  · Finally, fill in the box that shows how long this took.  After counting 10 movements, you no longer have to count any more that day.  The next morning, just start counting again as soon as you get up.    What should you call a \"movement\"?  It is hard to say, because it will feel different from one mother to another and from one pregnancy to the next.  The important thing is that you count the movements the same way throughout your pregnancy.  If you have more questions, you should ask your physician.    Count carefully every day!  SAMPLE:  Week 28    How many hours did it take to feel 10 movements?       Start  Time     1     2     3     4     5     6     7     8     9     10   Finish Time   Mon 8:20 ·  ·  ·  ·  ·  ·  ·  ·  ·  ·  11:40                  Sat               Sun                 IMPORTANT: You should contact your physician if it takes more than two hours for you to feel 10 movements.  Each morning, write down the time and start to count the movements of your baby.  Keep track by checking off one box every time you feel one movement.  When you have felt " "10 \"kicks\", write down the time you finished counting in the last column.  Then fill in the   box (over the check jose) for the number of hours it took.  Be sure to read the complete instructions on the previous page.            "
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
## 2018-05-07 NOTE — PROGRESS NOTES
Pt here today for OB follow up  Pt states no complaints   Reports +FM  Good # [phone]   Pharmacy Confirmed.  Pt given 1 hr GTT and instructions.  Pt given ORQUIDEA sheet and instructions.  Pt would like to be asked at next visit about TDAP vaccine

## 2018-05-23 ENCOUNTER — ROUTINE PRENATAL (OUTPATIENT)
Dept: OBGYN | Facility: CLINIC | Age: 23
End: 2018-05-23
Payer: MEDICAID

## 2018-05-23 ENCOUNTER — HOSPITAL ENCOUNTER (OUTPATIENT)
Dept: RADIOLOGY | Facility: MEDICAL CENTER | Age: 23
End: 2018-05-23
Attending: OBSTETRICS & GYNECOLOGY
Payer: MEDICAID

## 2018-05-23 VITALS — SYSTOLIC BLOOD PRESSURE: 122 MMHG | BODY MASS INDEX: 27.76 KG/M2 | DIASTOLIC BLOOD PRESSURE: 74 MMHG | WEIGHT: 188 LBS

## 2018-05-23 DIAGNOSIS — Z34.02 ENCOUNTER FOR SUPERVISION OF NORMAL FIRST PREGNANCY IN SECOND TRIMESTER: ICD-10-CM

## 2018-05-23 PROCEDURE — 90715 TDAP VACCINE 7 YRS/> IM: CPT | Performed by: OBSTETRICS & GYNECOLOGY

## 2018-05-23 PROCEDURE — 90040 PR PRENATAL FOLLOW UP: CPT | Performed by: OBSTETRICS & GYNECOLOGY

## 2018-05-23 PROCEDURE — 90471 IMMUNIZATION ADMIN: CPT | Performed by: OBSTETRICS & GYNECOLOGY

## 2018-05-23 PROCEDURE — 76815 OB US LIMITED FETUS(S): CPT

## 2018-05-23 NOTE — PROGRESS NOTES
Patient is at 30w2d. (+) whitish vaginal d/c - yeast  Good FM, no contractions, no LOF, no VB  Patients' weight gain, fluid intake and exercise level discussed.Vitals, fundal height , fetal position, and FHR reviewed on flowsheet.    .../74   Wt 85.3 kg (188 lb)   BMI 27.76 kg/m²   Past Medical History:   Diagnosis Date   • Allergy    • Anemia    • Costochondritis    • Head ache    • Migraine    • Urinary tract infection    • Yeast infection 2018     Patient Active Problem List    Diagnosis Date Noted   • Encounter for supervision of normal first pregnancy in second trimester 2018   • Yeast infection 2018     Lab:No results found for this or any previous visit (from the past 336 hour(s)).    Assessment: 23 year old   1  30w2d  2. Doing well  3. Size equals Dates and/or Scan  4. Weight gain: normal: Yes, excessive:No                    Plan:  1. KOH - hyphae seen. May take OTC monistat PV  2. Rediscuss diet.  3. Increase water intake    4. Continue vitamins.  5. Fetal kick counts as instructed.  5. TDap given  6. OB ff-up visit 2 weeks

## 2018-05-23 NOTE — PROGRESS NOTES
Pt here today for OB follow up  Pt states having yellowish discharge, denies odor or itching   Reports +  Good # [phone]   Pharmacy Confirmed.  Pt states she has not got done the labs as she has been sick.  Tdap vaccine given. Right  Deltoid. VIS given and screening check list reviewed with pt. Vaccine verified by TK/GINA